# Patient Record
(demographics unavailable — no encounter records)

---

## 2025-04-14 NOTE — PHYSICAL EXAM
[de-identified] : Constitutional: - No acute distress - Well developed; well nourished   Neurological: - normal mood and affect - alert and oriented x 3   Cardiovascular: - grossly normal  Lumbar Spine Exam:   Inspection: erythema (-) ecchymosis (-) rashes (-) alignment: no scoliosis - Well-healed bilateral paramedian thoracic surgical scars  Palpation: Midline lumbar tenderness:            (-) midline thoracic tenderness:          (-) Lumbar paraspinal tenderness:      L (+); R (+) thoracic paraspinal tenderness:     L (+); R (+) sciatic nerve tenderness :              L (-); R (-) SI joint tenderness:                        L (-); R (-) GTB tenderness:                            L (-); R (-)   ROM: Reduced in all planes pain with flexion>extension   Strength:                                    Right       Left  Hip Flexion:                (5/5)       (5/5) Quadriceps:               (5/5)       (5/5) Hamstrings:                (5/5)       (5/5) Ankle Dorsiflexion:     (5/5)       (5/5) EHL:                           (5/5)       (5/5) Ankle Plantarflexion:  (5/5)       (5/5)   Special Tests: SLR:                           R (=) ; L (=) Facet loading:            R (+) ; L (+) STEFANO test:               R (n/a); L (n/a) Hamstring tightness:  R (+); L (+)   Neurologic: SI LT throughout right lower extremity SI LT throughout left lower extremity   Reflexes normal and symmetric bilateral lower extremities   Gait: non- antalgic gait ambulates without assistive device

## 2025-04-14 NOTE — ASSESSMENT
[FreeTextEntry1] : We discussed the nature of the underlying pathology and available pain management treatment options. These included interventional, rehabilitative, pharmacological, and complementary modalities. We will proceed with the following:    Interventional treatment options: - None indicated at present time  - Will consider further pending review of MRI imaging - see additional instructions below    Rehabilitative options: - Initiate trial of physical therapy for the lumbar spine - Participation in active HEP was discussed and encouraged as tolerated   Medication based treatment options: - Poor candidate for oral NSAIDs secondary to ongoing GI issues - Continue Tylenol 500-1000 mg up to TID as needed - See additional instructions below    Complementary treatment options: - Weight management and lifestyle modifications discussed   Additional treatment recommendations as follows: - Obtain MRI lumbar spine - Follow-up in 6 weeks  I, Tayler Samano, acting as scribe, attest that this documentation has been prepared under the direction and in the presence of Provider Derian Trent DO.  The documentation recorded by the scribe, in my presence, accurately reflects the service I personally performed, and the decisions made by me with my edits as appropriate.

## 2025-04-14 NOTE — PHYSICAL EXAM
[de-identified] : Constitutional: - No acute distress - Well developed; well nourished   Neurological: - normal mood and affect - alert and oriented x 3   Cardiovascular: - grossly normal  Lumbar Spine Exam:   Inspection: erythema (-) ecchymosis (-) rashes (-) alignment: no scoliosis - Well-healed bilateral paramedian thoracic surgical scars  Palpation: Midline lumbar tenderness:            (-) midline thoracic tenderness:          (-) Lumbar paraspinal tenderness:      L (+); R (+) thoracic paraspinal tenderness:     L (+); R (+) sciatic nerve tenderness :              L (-); R (-) SI joint tenderness:                        L (-); R (-) GTB tenderness:                            L (-); R (-)   ROM: Reduced in all planes pain with flexion>extension   Strength:                                    Right       Left  Hip Flexion:                (5/5)       (5/5) Quadriceps:               (5/5)       (5/5) Hamstrings:                (5/5)       (5/5) Ankle Dorsiflexion:     (5/5)       (5/5) EHL:                           (5/5)       (5/5) Ankle Plantarflexion:  (5/5)       (5/5)   Special Tests: SLR:                           R (=) ; L (=) Facet loading:            R (+) ; L (+) STEFANO test:               R (n/a); L (n/a) Hamstring tightness:  R (+); L (+)   Neurologic: SI LT throughout right lower extremity SI LT throughout left lower extremity   Reflexes normal and symmetric bilateral lower extremities   Gait: non- antalgic gait ambulates without assistive device
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
## 2025-04-14 NOTE — WORK
[Total (100%)] : total (100%) [I provided the services listed above] :  I provided the services listed above.
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
## 2025-07-01 NOTE — PHYSICAL EXAM
[de-identified] : Constitutional: - No acute distress - Well developed; well nourished   Neurological: - normal mood and affect - alert and oriented x 3   Cardiovascular: - grossly normal  Lumbar Spine Exam:   Inspection: erythema (-) ecchymosis (-) rashes (-) alignment: no scoliosis - Well-healed bilateral paramedian thoracic surgical scars  Palpation: Midline lumbar tenderness:            (-) midline thoracic tenderness:          (-) Lumbar paraspinal tenderness:      L (+); R (+) thoracic paraspinal tenderness:     L (+); R (+) sciatic nerve tenderness :              L (-); R (-) SI joint tenderness:                        L (-); R (-) GTB tenderness:                            L (-); R (-)   ROM: Reduced in all planes pain with flexion=extension   Strength:                                    Right       Left  Hip Flexion:                (5/5)       (5/5) Quadriceps:               (5/5)       (5/5) Hamstrings:                (5/5)       (5/5) Ankle Dorsiflexion:     (5/5)       (5/5) EHL:                           (5/5)       (5/5) Ankle Plantarflexion:  (5/5)       (5/5)   Special Tests: SLR:                           R (=) ; L (=) Facet loading:            R (+) ; L (+) STEFANO test:               R (n/a); L (n/a) Hamstring tightness:  R (+); L (+)   Neurologic: SI LT throughout right lower extremity SI LT throughout left lower extremity   Reflexes normal and symmetric bilateral lower extremities   Gait: non- antalgic gait ambulates without assistive device  Cervical Spine Exam:   Inspection: erythema (-) ecchymosis (-) rashes (-)   Palpation:                                               Cervical paraspinal tenderness:         R (-); L (+) Upper trapezius tenderness:              R (-); L (+) Rhomboids tenderness:                      R (-); L (-) Occipital Ridge:                                   R (-); L (-) Supraspinatus tenderness:                 R (-); L (-)   ROM: WNL Pain with extremes of extension and bilateral rotation  Strength Testing:            Right    Left Deltoid                             (5/5)    (5/5) Biceps:                            (5/5)    (5/5) Triceps:                           (5/5)    (5/5) Finger Abductors:           (5/5)    (5/5) Grasp:                             (5/5)    (5/5)   Special Testing: Spurling Test:                  R (-); L (=) Facet load test:               R (+); L (+) Armendariz test:                  R (-); L (-)   Neuro: SILT throughout right upper extremity SI LT throughout left upper extremity   Reflexes: Biceps   -           R (2+); L (2+) Triceps  -           R (2+); L (2+) Brachioradialis- R (2+); L (2+)   No ankle clonus

## 2025-07-01 NOTE — ASSESSMENT
[FreeTextEntry1] : We discussed the nature of the underlying pathology and available pain management treatment options. These included interventional, rehabilitative, pharmacological, and complementary modalities. We will proceed with the following:    Interventional treatment options: - Discussed trial of bilateral L5-S1 TFESI with fluoroscopic guidance - Possible candidate for lumbar/cervical facet directed runs for ongoing axial neck/low back pain; not discussed - Patient prefers to exhaust further conservative care prior to consideration for interventional therapy - see additional instructions below    Rehabilitative options: - Initiate trial of physical therapy for the lumbar spine; prescription provided - Participation in active HEP was discussed and encouraged as tolerated   Medication based treatment options: - Poor candidate for oral NSAIDs secondary to ongoing GI issues - Continue Tylenol 500-1000 mg up to TID as needed - See additional instructions below    Complementary treatment options: - Weight management and lifestyle modifications discussed   Additional treatment recommendations as follows: - Obtain MRI cervical spine - Follow-up in 6 weeks
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
## 2025-07-01 NOTE — PHYSICAL EXAM
[de-identified] : Constitutional: - No acute distress - Well developed; well nourished   Neurological: - normal mood and affect - alert and oriented x 3   Cardiovascular: - grossly normal  Lumbar Spine Exam:   Inspection: erythema (-) ecchymosis (-) rashes (-) alignment: no scoliosis - Well-healed bilateral paramedian thoracic surgical scars  Palpation: Midline lumbar tenderness:            (-) midline thoracic tenderness:          (-) Lumbar paraspinal tenderness:      L (+); R (+) thoracic paraspinal tenderness:     L (+); R (+) sciatic nerve tenderness :              L (-); R (-) SI joint tenderness:                        L (-); R (-) GTB tenderness:                            L (-); R (-)   ROM: Reduced in all planes pain with flexion=extension   Strength:                                    Right       Left  Hip Flexion:                (5/5)       (5/5) Quadriceps:               (5/5)       (5/5) Hamstrings:                (5/5)       (5/5) Ankle Dorsiflexion:     (5/5)       (5/5) EHL:                           (5/5)       (5/5) Ankle Plantarflexion:  (5/5)       (5/5)   Special Tests: SLR:                           R (=) ; L (=) Facet loading:            R (+) ; L (+) STEFANO test:               R (n/a); L (n/a) Hamstring tightness:  R (+); L (+)   Neurologic: SI LT throughout right lower extremity SI LT throughout left lower extremity   Reflexes normal and symmetric bilateral lower extremities   Gait: non- antalgic gait ambulates without assistive device  Cervical Spine Exam:   Inspection: erythema (-) ecchymosis (-) rashes (-)   Palpation:                                               Cervical paraspinal tenderness:         R (-); L (+) Upper trapezius tenderness:              R (-); L (+) Rhomboids tenderness:                      R (-); L (-) Occipital Ridge:                                   R (-); L (-) Supraspinatus tenderness:                 R (-); L (-)   ROM: WNL Pain with extremes of extension and bilateral rotation  Strength Testing:            Right    Left Deltoid                             (5/5)    (5/5) Biceps:                            (5/5)    (5/5) Triceps:                           (5/5)    (5/5) Finger Abductors:           (5/5)    (5/5) Grasp:                             (5/5)    (5/5)   Special Testing: Spurling Test:                  R (-); L (=) Facet load test:               R (+); L (+) Armendariz test:                  R (-); L (-)   Neuro: SILT throughout right upper extremity SI LT throughout left upper extremity   Reflexes: Biceps   -           R (2+); L (2+) Triceps  -           R (2+); L (2+) Brachioradialis- R (2+); L (2+)   No ankle clonus